# Patient Record
Sex: FEMALE | Race: BLACK OR AFRICAN AMERICAN
[De-identification: names, ages, dates, MRNs, and addresses within clinical notes are randomized per-mention and may not be internally consistent; named-entity substitution may affect disease eponyms.]

---

## 2020-03-06 ENCOUNTER — HOSPITAL ENCOUNTER (INPATIENT)
Dept: HOSPITAL 35 - ER | Age: 45
LOS: 3 days | Discharge: NURSING, REHAB OR LONG-TERM CARE | DRG: 310 | End: 2020-03-09
Attending: INTERNAL MEDICINE | Admitting: INTERNAL MEDICINE
Payer: COMMERCIAL

## 2020-03-06 VITALS — SYSTOLIC BLOOD PRESSURE: 127 MMHG | DIASTOLIC BLOOD PRESSURE: 76 MMHG

## 2020-03-06 VITALS — DIASTOLIC BLOOD PRESSURE: 72 MMHG | SYSTOLIC BLOOD PRESSURE: 134 MMHG

## 2020-03-06 VITALS — WEIGHT: 208.56 LBS | HEIGHT: 67 IN | BODY MASS INDEX: 32.73 KG/M2

## 2020-03-06 VITALS — DIASTOLIC BLOOD PRESSURE: 66 MMHG | SYSTOLIC BLOOD PRESSURE: 142 MMHG

## 2020-03-06 DIAGNOSIS — F32.9: ICD-10-CM

## 2020-03-06 DIAGNOSIS — R13.10: ICD-10-CM

## 2020-03-06 DIAGNOSIS — G89.29: ICD-10-CM

## 2020-03-06 DIAGNOSIS — F11.90: ICD-10-CM

## 2020-03-06 DIAGNOSIS — Z99.3: ICD-10-CM

## 2020-03-06 DIAGNOSIS — E87.6: ICD-10-CM

## 2020-03-06 DIAGNOSIS — E83.42: ICD-10-CM

## 2020-03-06 DIAGNOSIS — J45.909: ICD-10-CM

## 2020-03-06 DIAGNOSIS — Z79.84: ICD-10-CM

## 2020-03-06 DIAGNOSIS — K21.9: ICD-10-CM

## 2020-03-06 DIAGNOSIS — Z79.899: ICD-10-CM

## 2020-03-06 DIAGNOSIS — I10: ICD-10-CM

## 2020-03-06 DIAGNOSIS — Z79.4: ICD-10-CM

## 2020-03-06 DIAGNOSIS — E78.5: ICD-10-CM

## 2020-03-06 DIAGNOSIS — Z91.018: ICD-10-CM

## 2020-03-06 DIAGNOSIS — Z91.048: ICD-10-CM

## 2020-03-06 DIAGNOSIS — F41.9: ICD-10-CM

## 2020-03-06 DIAGNOSIS — Z88.8: ICD-10-CM

## 2020-03-06 DIAGNOSIS — E11.42: ICD-10-CM

## 2020-03-06 DIAGNOSIS — Z98.1: ICD-10-CM

## 2020-03-06 DIAGNOSIS — I49.3: Primary | ICD-10-CM

## 2020-03-06 DIAGNOSIS — K58.9: ICD-10-CM

## 2020-03-06 DIAGNOSIS — Z96.643: ICD-10-CM

## 2020-03-06 LAB
ANION GAP SERPL CALC-SCNC: 3 MMOL/L (ref 7–16)
BASOPHILS NFR BLD AUTO: 0.7 % (ref 0–2)
BUN SERPL-MCNC: 6 MG/DL (ref 7–18)
CALCIUM SERPL-MCNC: 9 MG/DL (ref 8.5–10.1)
CHLORIDE SERPL-SCNC: 101 MMOL/L (ref 98–107)
CO2 SERPL-SCNC: 33 MMOL/L (ref 21–32)
CREAT SERPL-MCNC: 0.9 MG/DL (ref 0.6–1)
EOSINOPHIL NFR BLD: 2.4 % (ref 0–3)
ERYTHROCYTE [DISTWIDTH] IN BLOOD BY AUTOMATED COUNT: 14.4 % (ref 10.5–14.5)
GLUCOSE SERPL-MCNC: 306 MG/DL (ref 74–106)
GRANULOCYTES NFR BLD MANUAL: 58 % (ref 36–66)
HCT VFR BLD CALC: 36.8 % (ref 37–47)
HGB BLD-MCNC: 12 GM/DL (ref 12–15)
LYMPHOCYTES NFR BLD AUTO: 32.4 % (ref 24–44)
MAGNESIUM SERPL-MCNC: 1.8 MG/DL (ref 1.8–2.4)
MCH RBC QN AUTO: 26.9 PG (ref 26–34)
MCHC RBC AUTO-ENTMCNC: 32.6 G/DL (ref 28–37)
MCV RBC: 82.6 FL (ref 80–100)
MONOCYTES NFR BLD: 6.5 % (ref 1–8)
NEUTROPHILS # BLD: 3.8 THOU/UL (ref 1.4–8.2)
PLATELET # BLD: 257 THOU/UL (ref 150–400)
POTASSIUM SERPL-SCNC: 3.4 MMOL/L (ref 3.5–5.1)
RBC # BLD AUTO: 4.46 MIL/UL (ref 4.2–5)
SODIUM SERPL-SCNC: 137 MMOL/L (ref 136–145)
TROPONIN I SERPL-MCNC: <0.06 NG/ML (ref ?–0.06)
WBC # BLD AUTO: 6.6 THOU/UL (ref 4–11)

## 2020-03-06 PROCEDURE — 10081 I&D PILONIDAL CYST COMP: CPT

## 2020-03-06 PROCEDURE — 10797: CPT

## 2020-03-07 VITALS — DIASTOLIC BLOOD PRESSURE: 71 MMHG | SYSTOLIC BLOOD PRESSURE: 118 MMHG

## 2020-03-07 VITALS — SYSTOLIC BLOOD PRESSURE: 126 MMHG | DIASTOLIC BLOOD PRESSURE: 59 MMHG

## 2020-03-07 VITALS — SYSTOLIC BLOOD PRESSURE: 126 MMHG | DIASTOLIC BLOOD PRESSURE: 70 MMHG

## 2020-03-07 VITALS — SYSTOLIC BLOOD PRESSURE: 123 MMHG | DIASTOLIC BLOOD PRESSURE: 94 MMHG

## 2020-03-07 VITALS — DIASTOLIC BLOOD PRESSURE: 68 MMHG | SYSTOLIC BLOOD PRESSURE: 130 MMHG

## 2020-03-07 LAB
ANION GAP SERPL CALC-SCNC: 4 MMOL/L (ref 7–16)
BASOPHILS NFR BLD AUTO: 0.7 % (ref 0–2)
BUN SERPL-MCNC: 8 MG/DL (ref 7–18)
CALCIUM SERPL-MCNC: 9.2 MG/DL (ref 8.5–10.1)
CHLORIDE SERPL-SCNC: 100 MMOL/L (ref 98–107)
CO2 SERPL-SCNC: 34 MMOL/L (ref 21–32)
CREAT SERPL-MCNC: 0.9 MG/DL (ref 0.6–1)
EOSINOPHIL NFR BLD: 2.8 % (ref 0–3)
ERYTHROCYTE [DISTWIDTH] IN BLOOD BY AUTOMATED COUNT: 14.9 % (ref 10.5–14.5)
GLUCOSE SERPL-MCNC: 287 MG/DL (ref 74–106)
GRANULOCYTES NFR BLD MANUAL: 58.5 % (ref 36–66)
HCT VFR BLD CALC: 37.4 % (ref 37–47)
HGB BLD-MCNC: 12.2 GM/DL (ref 12–15)
LYMPHOCYTES NFR BLD AUTO: 32.4 % (ref 24–44)
MAGNESIUM SERPL-MCNC: 2.1 MG/DL (ref 1.8–2.4)
MCH RBC QN AUTO: 27.2 PG (ref 26–34)
MCHC RBC AUTO-ENTMCNC: 32.6 G/DL (ref 28–37)
MCV RBC: 83.4 FL (ref 80–100)
MONOCYTES NFR BLD: 5.6 % (ref 1–8)
NEUTROPHILS # BLD: 3.7 THOU/UL (ref 1.4–8.2)
PLATELET # BLD: 278 THOU/UL (ref 150–400)
POTASSIUM SERPL-SCNC: 4.1 MMOL/L (ref 3.5–5.1)
RBC # BLD AUTO: 4.48 MIL/UL (ref 4.2–5)
SODIUM SERPL-SCNC: 138 MMOL/L (ref 136–145)
TROPONIN I SERPL-MCNC: <0.06 NG/ML (ref ?–0.06)
WBC # BLD AUTO: 6.3 THOU/UL (ref 4–11)

## 2020-03-07 PROCEDURE — 02HV33Z INSERTION OF INFUSION DEVICE INTO SUPERIOR VENA CAVA, PERCUTANEOUS APPROACH: ICD-10-PCS | Performed by: HOSPITALIST

## 2020-03-07 NOTE — NUR
PT ADMITTED FROM ED WITH BIGEMINY AND PALPITATIONS.ARRIVED TO UNIT VIA CART
ACCOMPANIED BY THE FRIEND.A/OX4.VSS.PT DENIES PALPITATIONS,CHEST PAIN UPON
ARRIVAL.C/O PAIN TO NECK,PT S/P SURGERY TO CERVICAL.HAS
ASPEN COLLAR IN PLACE.NOTED INCISIONS TO NECK
ANTERIORLY W/STERISTRIP IN PLACE LEFT
DOUBLE LUMEN CENTRAL LINE IN PLACE.MAGNESIUM AND POTASSIUM BEING
REPLACED.ASSESSMENT AS DOCUMENTED.PAIN MANAGED  WITH PAIN MEDS AS PER
ORDERS.UP WITH ASSIST TO BSC.POC IS TO HAVE CARDIOLOGY SEE THE PATIENT AND
HAVE ECHO THEN TRANSFER BACK TO [facility].WILL CONT TO MONITOR PER POC.

## 2020-03-07 NOTE — NUR
ASSESSMENT AS CHARTED. PT ALERT AND ORIENTED. PRN PAIN MED GIVEN AS ORDERED.
EVALUATED BY SPEECH. ORDERS GIVEN TO KEEP PT NPO FOR NOW TILL MONDAY FOR MORE
EVALUATION. DR. WADE AWARE. PT REPORT HER THAT HER COLLAR BRACE MISSING. DR WADE AND HOUSE SUPERVISOR NOTIFIED. FAMILY AT THE BEDSIDE WILL CONTINUE TO
MONITOR.

## 2020-03-07 NOTE — EKG
[facility]
[address]
[place]   [address]                     ELECTROCARDIOGRAM REPORT      
_______________________________________________________________________________
 
Name:       BLANCA BARBER              Room #:         209-P       ADM IN  
M.R.#:      [id number]                       Account #:      [id number]  
Admission:  20    Attend Phys:    Jose Raul Johnson MD    
Discharge:              Date of Birth:  [birth year]  
                                                          Report #: [id number]
                                                                    [id number]
_______________________________________________________________________________
THIS REPORT FOR:  
 
cc:  FAM - No family physician/PCP 
     FAM - No family physician/PCP 
     Martin Moreland MD [facility]                                        ~
THIS REPORT FOR:   //name//                          
 
                         [facility] ED
                                       
Test Date:    2020               Test Time:    20:19:12
Pat Name:     BLANCA BARBER           Department:   
Patient ID:   [id number]            Room:         209
Gender:       F                        Technician:   DEVAUGHN
:          1975               Requested By: Enmanuel Quiroz
Order Number: [id number] MD:   Martin Moreland
                                 Measurements
Intervals                              Axis          
Rate:         98                       P:            59
RI:           168                      QRS:          82
QRSD:         90                       T:            173
QT:           419                                    
QTc:          536                                    
                           Interpretive Statements
Sinus rhythm
Nonspecific T abnormalities, diffuse leads
Prolonged QT interval
No previous ECG available for comparison
 
Electronically Signed On 3-7-2020 10:20:49 CST by Martin Moreland
[email or web address]
 
 
 
 
 
 
 
 
 
 
 
 
 
 
  <ELECTRONICALLY SIGNED>
   By: Martin Moreland MD, FACC   
  03/07/20     1020
D: 20                           _____________________________________
T: 20                           Martin Moreland MD, [facility]     /EPI

## 2020-03-07 NOTE — NUR
ASSESSMENT:
PT REMAIN ALERT AND ORIENT TIMES FOUR. C/O NECK PAIN. C-COLLAR ON PT.  PT
CONTINUES TO ADJUST COLLAR AND STATE THAT IT HURTS BEING ON. ENCOURAGED TO
LEAVE COLLAR IN PLACE FOR BETTER HEALING.  S/O AT THE BEDSIDE.  VSS, AFEBRILE.
SR PER MONITOR.  PAIN MEDS GIVEN PRN WITH PARTIAL RELIEF. SLOW PROGRESS
TOWARDS DC GOALS. WILL CONTINUE TO MONITOR.

## 2020-03-08 VITALS — SYSTOLIC BLOOD PRESSURE: 115 MMHG | DIASTOLIC BLOOD PRESSURE: 65 MMHG

## 2020-03-08 VITALS — DIASTOLIC BLOOD PRESSURE: 65 MMHG | SYSTOLIC BLOOD PRESSURE: 119 MMHG

## 2020-03-08 VITALS — SYSTOLIC BLOOD PRESSURE: 140 MMHG | DIASTOLIC BLOOD PRESSURE: 76 MMHG

## 2020-03-08 VITALS — DIASTOLIC BLOOD PRESSURE: 64 MMHG | SYSTOLIC BLOOD PRESSURE: 135 MMHG

## 2020-03-08 VITALS — DIASTOLIC BLOOD PRESSURE: 70 MMHG | SYSTOLIC BLOOD PRESSURE: 132 MMHG

## 2020-03-08 LAB
CHOLEST SERPL-MCNC: 122 MG/DL (ref ?–200)
EST. AVERAGE GLUCOSE BLD GHB EST-MCNC: 269 MG/DL
GLYCOHEMOGLOBIN (HGB A1C): 11 % (ref 4.8–5.6)
HDLC SERPL-MCNC: 43 MG/DL (ref 40–?)
LDLC SERPL-MCNC: 56 MG/DL (ref ?–100)
TC:HDL: 2.8 RATIO
TRIGL SERPL-MCNC: 119 MG/DL (ref ?–150)
VLDLC SERPL CALC-MCNC: 24 MG/DL (ref ?–40)

## 2020-03-08 NOTE — NUR
PATIENT TOOK OF NECK BRACE, EDUCATED ON THE IMPORTANCE OF WEARING TO PREVENT
INJURY. PATIENT COMPLAINING PAIN NOT RELIEVED WITH MEDICATION. NURSE
PRACTITIONER NOTIFED OF PATIENT PAIN MANAGEMENT AND NOT WEARING NECK BRACE.
NEW ORDERS NOTED.

## 2020-03-08 NOTE — NUR
ASSUMED CARE OF PATIENT AT 0700. ASSESSMENT COMPLTED. TELE STRIP PRINTED AND
PLACED IN CHART. PATIENT'S SIGNIFICANT OTHER AT BEDSIDE. PATIENT WORE HER NECK
BRACE 80% OF THE DAY AND HAD TO BE REMINDED TO PUT IT BACK ON TWICE. PATIENT
IS ON STRICT NPO AND MEDS THAT COULD BE TRANSFERRED TO IV, WERE TRANSFERRED
PRIOR TO NIGHT SHIFT. PATIENT CALLED OUT AT ONE POINT SAYING THAT SHE WAS
CHOKING ON SOMETHING. WHEN I WALKED IN THE ROOM, THE PATIENT HAD HER NECK
BRACE OFF AND HAD REMOVED THE DRESSING OF HER IJ, PULLING ON HER IJ. I HAD TO
PLACE A TEMPORARY STERILE DRESSING ON AND ASK IV TEAM TO EVALUATE IT. THE
PATIENT HAD DAMAGED THE IV AND IT HAD TO BE REMOVED, IV TEAM PLACED A NEW
PERIPHERAL IV. NEW IV PAIN MED ADDED AND PATIENT TAKING ALTERNATING PAIN MEDS
EVERY TWO HOURS BUT CONTINUES TO CALL OUT HOURLY, STATING THAT SHE NEVER
RECEIVED THEM. SHE ALSO ASKED FOR HER NURSE EVERY TIME I WALKED IN THE ROOM,
NOT REMEMBERING THAT I AM HER NURSE. PATIENT IS ADAMANT THAT SHE WANTS TO GO
TO REHAB TOMORROW AND VERY CONCERNED THAT HER ROOM WILL BE TAKEN IF SHE
DOESN'T GET THERE QUICKLY. PATIENT TO CONTINUE WITH POC.

## 2020-03-09 VITALS — DIASTOLIC BLOOD PRESSURE: 68 MMHG | SYSTOLIC BLOOD PRESSURE: 129 MMHG

## 2020-03-09 VITALS — SYSTOLIC BLOOD PRESSURE: 129 MMHG | DIASTOLIC BLOOD PRESSURE: 68 MMHG

## 2020-03-09 VITALS — SYSTOLIC BLOOD PRESSURE: 137 MMHG | DIASTOLIC BLOOD PRESSURE: 83 MMHG

## 2020-03-09 VITALS — SYSTOLIC BLOOD PRESSURE: 115 MMHG | DIASTOLIC BLOOD PRESSURE: 43 MMHG

## 2020-03-09 NOTE — NUR
Patient transferred to [facility] from [facility]. Patient rec neck sx at
[facility] and transferred to [facility] for palpatations. Sp with [facility] and
they evaled patient while at [facility]. They rec auth from insurance.
Clinical update faxed to [facility]. Sp with patient who reports that was her plan
to dc to rehab at [facility]. Updated phys, rec orders for dc today to rehab.
Updated [facility] and faxed orders. [facility] transport for 1630. Updated patient
offered to call mom but patient reports she will call. Chart to be copied. Rn
has elza for report.

## 2020-03-09 NOTE — NUR
PT CARE ASSUMED APPROXIMATELY 0700. PT ASSESSMENTS AS CHARTED. PT MEDICATION
AS DELIVERED. PT BEING TRANSFERRED TO [facility] REHAB. REPORT GIVEN. TELE
D/C'D. IV D/C'D.

## 2020-03-09 NOTE — NUR
NO OVERNIGHT EVENTS. PT. STILL STRUGGLING WITH PAIN MANGEMENT. ADDITION OF
DILAUDID SEEMS TO BE HELPING OUT THE MOST. ASSESSMENT AND VITAL SIGNS AS
CHARTED. PT. TO GET SWALLOW STUDY DONE TODAY THEN DISCHARGE PER PREVIOUS RN
REPORT. CONTINUE CURRENT POC. WILL CONTINUE TO MONITOR.

## 2023-08-28 NOTE — 2DMMODE
[facility]
[place]   [address]                   2 D/M-MODE ECHOCARDIOGRAM     
_______________________________________________________________________________
 
Name:       DARRYL BARBERAKILAH STOREY              Room #:         209-P       ADM IN  
.R.#:      [id number]                       Account #:      [id number]  
Admission:  20    Attend Phys:    Jose Raul Johnson MD    
Discharge:              Date of Birth:  [birth year]  
                                                          Report #: [id number]
                                                                    [id number]
_______________________________________________________________________________
THIS REPORT FOR:  
 
cc:  DEEPAK - No family physician/PCP 
     DEEPAK - No family physician/PCP 
     Martin Moreland MD [facility]                                        
                                                                       ~
 
--------------- APPROVED REPORT --------------
 
 
Study performed:  2020 09:01:03
 
EXAM: Comprehensive 2D, Doppler, and color-flow 
Echocardiogram 
Patient Location: Bedside   
Room #:  209     Status:  routine
 
      BSA:         2.06
HR: 94 bpm BP:          118/71 mmHg 
Rhythm: NSR     
 
Other Information 
Study Quality: Technically Difficult
Technically limited study due to  body habitus, inability to position 
patient.
 
Indications
Arrhythmia
Diabetes
HLD
 
2D Dimensions
RVDd:  28.71 mm  
IVSd:  11.08 (7-11mm) LVOT Diam:  21.83 (18-24mm) 
LVDd:  34.15 mm  
PWd:  9.96 (7-11mm) Ascending Ao:  25.82 (22-36mm)
LVDs:  23.23 (25-40mm) 
Aortic Root:  28.94 mm 
 
Volumes
Left Atrial Volume (Systole) 
Single Plane 4CH:  23.39 mL Single Plane 2CH:  19.21 mL
    LA ESV Index:  11.00 mL/m2
 
Aortic Valve
AoV Peak Cezar.:  1.11 m/s 
 
 
[facility]
[address]
[place]  [address]
Phone:  [phone]                    2 D/M-MODE ECHOCARDIOGRAM     
_______________________________________________________________________________
 
Name:            BLANCA BARBER              Room #:        209-P       [facility] [place]
[facility]#:           [id number]          Account #:     [id number]  
Admission:       20         Attend Phys:   Jose Raul Johnson MD
Discharge:                  Date of Birth: [birth year]  
                         Report #:      [id number]
        [id number]
_______________________________________________________________________________
AO Peak Gr.:  4.96 mmHg  LVOT Max P.30 mmHg
    LVOT Max V:  0.76 m/s
NICK Vmax: 2.55 cm2  
 
Mitral Valve
    E/A Ratio:  0.8
    MV Decel. Time:  123.92 ms
MV E Max Cezar.:  0.80 m/s 
MV A Cezar.:  1.06 m/s  
MV PHT:  35.94 ms  
IVRT:  131.49 ms  
 
Pulmonary Valve
PV Peak Cezar.:  1.36 m/s PV Peak Gr.:  7.72 mmHg
 
Left Ventricle
The left ventricle is normal size. There is normal LV segmental wall 
motion. There is normal left ventricular wall thickness. The left 
ventricular systolic function is normal. The left ventricular 
ejection fraction is within the normal range. LVEF is 60-65%. Mild 
diastolic dysfunction is present (impaired relaxation 
pattern).
 
Right Ventricle
The right ventricle is normal size. The right ventricular systolic 
function is normal.
 
Atria
The left atrium size is normal. The right atrium size is 
normal.
 
Aortic Valve
The aortic valve is normal in structure. No aortic regurgitation is 
present. There is no aortic valvular stenosis.
 
Mitral Valve
The mitral valve is normal in structure. There is no mitral valve 
regurgitation noted. No evidence of mitral valve stenosis.
 
Tricuspid Valve
The tricuspid valve is normal in structure. There is no tricuspid 
valve regurgitation noted. Unable to assess PA pressure.
 
Pulmonic Valve
Pulmonic valve is not well visualized.
 
 
 
[facility]
[place]  [address]
Phone:  [phone]                    2 D/M-MODE ECHOCARDIOGRAM     
_______________________________________________________________________________
 
Name:            DARRYL BARBERRA S              Room #:        209-P       ADM IN
M.R.#:           [id number]          Account #:     [id number]  
Admission:       20         Attend Phys:   Jose Raul Johnson MD
Discharge:                  Date of Birth: [birth year]  
                         Report #:      [id number]
        [id number]
_______________________________________________________________________________
Great Vessels
The aortic root is normal in size. IVC is not 
visualized.
 
Pericardium
There is no pericardial effusion.
 
<Conclusion>
The left ventricular systolic function is normal.
There is normal LV segmental wall motion.
LVEF is 60-65%.
Mild diastolic dysfunction is present (impaired relaxation 
pattern).
The aortic valve is normal in structure. No aortic regurgitation or 
stenosis
The mitral valve is normal in structure.  No mitral valve 
regurgitation.
Unable to assess pulmonary artery pressure.
There is no pericardial effusion.
 
 
 
 
 
 
 
 
 
 
 
 
 
 
 
 
 
 
 
 
 
 
 
 
 
  <ELECTRONICALLY SIGNED>
   By: Martin Moreland MD, [facility]   
  20     1008
D: 20 1008                           _____________________________________
T: 20 1008                           Martin Moreland MD, FACC     /INF None